# Patient Record
Sex: MALE | Race: WHITE
[De-identification: names, ages, dates, MRNs, and addresses within clinical notes are randomized per-mention and may not be internally consistent; named-entity substitution may affect disease eponyms.]

---

## 2017-07-02 ENCOUNTER — HOSPITAL ENCOUNTER (EMERGENCY)
Dept: HOSPITAL 56 - MW.ED | Age: 42
Discharge: HOME | End: 2017-07-02
Payer: COMMERCIAL

## 2017-07-02 VITALS — SYSTOLIC BLOOD PRESSURE: 105 MMHG | DIASTOLIC BLOOD PRESSURE: 69 MMHG

## 2017-07-02 DIAGNOSIS — F17.210: ICD-10-CM

## 2017-07-02 DIAGNOSIS — K04.7: Primary | ICD-10-CM

## 2017-07-02 PROCEDURE — 99282 EMERGENCY DEPT VISIT SF MDM: CPT

## 2017-07-02 NOTE — EDM.PDOC
ED HPI GENERAL MEDICAL PROBLEM





- General


Chief Complaint: ENT Problem


Stated Complaint: FACIAL SWELLING


Time Seen by Provider: 07/02/17 18:00


Source of Information: Reports: Patient


History Limitations: Reports: No Limitations





- History of Present Illness


INITIAL COMMENTS - FREE TEXT/NARRATIVE: 


History of present illness:


[41-year-old male presents with left-sided jaw swelling. Patient has had this 

in the past and has had subsequent dental work approximately 3 months ago with 

full resolution and now he finds it returning again today. Patient indicates he 

did dialogue with his dentist and received an antibiotic but he comes in 

seeking dental balls and a short run of pain medicine to get him to his 

appointment in 10 days]





Review of systems: 


As per history of present illness and below otherwise all systems reviewed and 

negative.





Past medical history: 


As per history of present illness and as reviewed below otherwise 

noncontributory.





Surgical history: 


As per history of present illness and as reviewed below otherwise 

noncontributory.





Social history: 


No reported history of drug or alcohol abuse.





Family history: 


As per history of present illness and as reviewed below otherwise 

noncontributory.





Physical exam:


HEENT: Atraumatic, swelling to left side of jaw in the region of 17 through 19, 

able to visualize a new crown on the lower left back molar, pupils reactive, 

negative for conjunctival pallor or scleral icterus, mucous membranes moist, 

throat clear, neck supple, nontender, trachea midline.


Lungs: Clear to auscultation, breath sounds equal bilaterally, chest nontender.


Heart: S1S2, regular, negative for clicks, rubs, or JVD.


Abdomen: Soft, nondistended, nontender. Negative for masses or 

hepatosplenomegaly. Negative for costovertebral tenderness.


Pelvis: Stable nontender.


Genitourinary: Deferred.


Rectal: Deferred.


Extremities: Atraumatic, negative for cords or calf pain. Neurovascular 

unremarkable.


Neuro: Awake, alert, oriented. Cranial nerves II through XII unremarkable. 

Cerebellum unremarkable. Motor and sensory unremarkable throughout. Exam 

nonfocal.





Diagnostics:


[]





Therapeutics:


[]





Impression: 


[Dental abscess]





Plan:


[Dental balls pain medicine]





Definitive disposition and diagnosis as appropriate pending reevaluation and 

review of above.








  ** Left Lower Face


Pain Score (Numeric/FACES): 10





- Related Data


 Allergies











Allergy/AdvReac Type Severity Reaction Status Date / Time


 


No Known Allergies Allergy   Verified 07/02/17 17:52











Home Meds: 


 Home Meds





Cephalexin 500 mg PO QID 07/02/17 [History]











Past Medical History





- Past Health History


Medical/Surgical History: Denies Medical/Surgical History


Other HEENT History: root canal





- Infectious Disease History


Infectious Disease History: Reports: Chicken Pox





- Past Surgical History


HEENT Surgical History: Reports: None





Social & Family History





- Family History


Family Medical History: Noncontributory





- Tobacco Use


Smoking Status *Q: Current Every Day Smoker


Years of Tobacco use: 22


Packs/Tins Daily: 1





- Caffeine Use


Caffeine Use: Reports: Coffee


Caffeine Use Comment: 2cups/day





- Recreational Drug Use


Recreational Drug Use: No





- Living Situation & Occupation


Living situation: Reports: 


Occupation: Employed





ED ROS ENT





- Review of Systems


Review Of Systems: See Below (See history of present illness)





ED EXAM, ENT





- Physical Exam


Exam: See Below (See history of present illness)





Course





- Vital Signs


Last Recorded V/S: 





 Last Vital Signs











Temp  37.0 C   07/02/17 17:47


 


Pulse  110 H  07/02/17 17:47


 


Resp  18   07/02/17 17:47


 


BP  114/72   07/02/17 17:47


 


Pulse Ox  97   07/02/17 17:47














- Orders/Labs/Meds


Meds: 





Medications














Discontinued Medications














Generic Name Dose Route Start Last Admin





  Trade Name Etienne  PRN Reason Stop Dose Admin


 


Benzocaine  2 each  07/02/17 18:11  





  Hurricaine One 20%  MUCMEM  07/02/17 18:12  





  ONETIME ONE   


 


Lidocaine HCl  15 ml  07/02/17 18:11  





  Xylocaine 2% Viscous  PO  07/02/17 18:12  





  ONETIME ONE   














Departure





- Departure


Time of Disposition: 18:38


Disposition: Home, Self-Care 01


Condition: Good


Clinical Impression: 


 Dental abscess








- Discharge Information


Forms:  ED Department Discharge


Additional Instructions: 


The following information is given to patients seen in the emergency department 

who are being discharged to home. This information is to outline your options 

for follow-up care. We provide all patients seen in our emergency department 

with a follow-up referral.





The need for follow-up, as well as the timing and circumstances, are variable 

depending upon the specifics of your emergency department visit.





If you don't have a primary care physician on staff, we will provide you with a 

referral. We always advise you to contact your personal physician following an 

emergency department visit to inform them of the circumstance of the visit and 

for follow-up with them and/or the need for any referrals to a consulting 

specialist.





The emergency department will also refer you to a specialist when appropriate. 

This referral assures that you have the opportunity for follow-up care with a 

specialist. All of these measure are taken in an effort to provide you with 

optimal care, which includes your follow-up.





Under all circumstances we always encourage you to contact your private 

physician who remains a resource for coordinating your care. When calling for 

follow-up care, please make the office aware that this follow-up is from your 

recent emergency room visit. If for any reason you are refused follow-up, 

please contact the [facility] Emergency 

Department at [phone] and asked to speak to the emergency department 

charge nurse.





Take medication as directed





Follow-up with your dentist as discussed








Return to ED as needed as discussed

## 2019-02-15 ENCOUNTER — HOSPITAL ENCOUNTER (EMERGENCY)
Dept: HOSPITAL 56 - MW.ED | Age: 44
Discharge: HOME | End: 2019-02-15
Payer: COMMERCIAL

## 2019-02-15 VITALS — DIASTOLIC BLOOD PRESSURE: 70 MMHG | SYSTOLIC BLOOD PRESSURE: 100 MMHG

## 2019-02-15 DIAGNOSIS — J06.9: Primary | ICD-10-CM

## 2019-02-15 DIAGNOSIS — Z87.891: ICD-10-CM

## 2019-02-15 NOTE — EDM.PDOC
ED HPI GENERAL MEDICAL PROBLEM





- General


Chief Complaint: Respiratory Problem


Stated Complaint: SICK


Time Seen by Provider: 02/15/19 11:01


Source of Information: Reports: Patient


History Limitations: Reports: No Limitations





- History of Present Illness


INITIAL COMMENTS - FREE TEXT/NARRATIVE: 





HISTORY AND PHYSICAL:





History of present illness:


Patient is a 43-year-old male here with complaint of fevers x 5 days. He states 

he has had temps around 102F, cough, and sore throat. Relative recently was 

treated for strep throat. He denies chest pain, SOB, vomiting, diarrhea, 

abdominal pain. He is taking Tylenol as needed for his fevers. 





Review of systems: 


As per history of present illness and below otherwise all systems reviewed and 

negative.





Past medical history: 


As per history of present illness and as reviewed below otherwise 

noncontributory.





Surgical history: 


As per history of present illness and as reviewed below otherwise 

noncontributory.





Social history: 


No reported history of drug or alcohol abuse.





Family history: 


As per history of present illness and as reviewed below otherwise 

noncontributory.





Physical exam:


General: Patient sitting comfortably in no acute distress and nontoxic appearing


HEENT: Atraumatic, normocephalic, pupils reactive, negative for conjunctival 

pallor or scleral icterus, mucous membranes moist, throat clear, neck supple, 

nontender, trachea midline. No meningeal signs. 


Lungs: Clear to auscultation, breath sounds equal bilaterally, chest nontender.


Heart: S1S2, regular, negative for clicks, rubs, or overt murmur.


Abdomen: Soft, nondistended, nontender. Negative for masses or 

hepatosplenomegaly. Negative for costovertebral tenderness.


Pelvis: Stable nontender.


Genitourinary: Deferred.


Rectal: Deferred.


Extremities: Atraumatic, negative for cords or calf pain. Neurovascular 

unremarkable.


Neuro: Awake, alert, oriented. Cranial nerves II through XII unremarkable. 

Cerebellum unremarkable. Motor and sensory unremarkable throughout. Exam 

nonfocal.





Notes: 





Diagnostics:


Rapid strep, influenza, CXR 





Therapeutics:


None





Prescriptions:


None





Impression: 


Viral URI 





Plan:


1. Alternate tylenol and motrin as needed. 


2. Follow up with primary care provider


3. Return to ED as needed as discussed 





Definitive disposition and diagnosis as appropriate pending reevaluation and 

review of above.








- Related Data


 Allergies











Allergy/AdvReac Type Severity Reaction Status Date / Time


 


No Known Allergies Allergy   Verified 02/15/19 10:52











Home Meds: 


 Home Meds





. [No Known Home Meds]  02/15/19 [History]











Past Medical History





- Past Health History


Medical/Surgical History: Denies Medical/Surgical History


Other HEENT History: root canal





- Infectious Disease History


Infectious Disease History: Reports: Chicken Pox





- Past Surgical History


HEENT Surgical History: Reports: None





Social & Family History





- Family History


Family Medical History: Noncontributory





- Tobacco Use


Smoking Status *Q: Former Smoker


Used Tobacco, but Quit: Yes


Month/Year Tobacco Last Used: 10/2018





- Caffeine Use


Caffeine Use: Reports: Coffee, Soda


Caffeine Use Comment: 2cups/day





- Recreational Drug Use


Recreational Drug Use: No





- Living Situation & Occupation


Living situation: Reports: 


Occupation: Employed





ED ROS GENERAL





- Review of Systems


Review Of Systems: ROS reveals no pertinent complaints other than HPI.





ED EXAM, GENERAL





- Physical Exam


Exam: See Below (see dictation)





Course





- Vital Signs


Last Recorded V/S: 


 Last Vital Signs











Temp  97.6 F   02/15/19 10:50


 


Pulse  68   02/15/19 10:50


 


Resp  18   02/15/19 10:50


 


BP  106/69   02/15/19 10:50


 


Pulse Ox  96   02/15/19 10:50














- Orders/Labs/Meds


Orders: 


 Active Orders 24 hr











 Category Date Time Status


 


 CULTURE STREP A CONFIRMATION [] Stat Lab  02/15/19 10:58 Results


 


 STREP SCRN A RAPID W CULT CONF [RM] Stat Lab  02/15/19 10:58 Results














Departure





- Departure


Time of Disposition: 12:58


Disposition: Home, Self-Care 01


Condition: Good


Clinical Impression: 


 Viral URI








- Discharge Information


Referrals: 


PCP,Unknown [Primary Care Provider] - 


Forms:  ED Department Discharge


Additional Instructions: 


The following information is given to patients seen in the emergency department 

who are being discharged to home. This information is to outline your options 

for follow-up care. We provide all patients seen in our emergency department 

with a follow-up referral.





The need for follow-up, as well as the timing and circumstances, are variable 

depending upon the specifics of your emergency department visit.





If you don't have a primary care physician on staff, we will provide you with a 

referral. We always advise you to contact your personal physician following an 

emergency department visit to inform them of the circumstance of the visit and 

for follow-up with them and/or the need for any referrals to a consulting 

specialist.





The emergency department will also refer you to a specialist when appropriate. 

This referral assures that you have the opportunity for follow-up care with a 

specialist. All of these measure are taken in an effort to provide you with 

optimal care, which includes your follow-up.





Under all circumstances we always encourage you to contact your private 

physician who remains a resource for coordinating your care. When calling for 

follow-up care, please make the office aware that this follow-up is from your 

recent emergency room visit. If for any reason you are refused follow-up, 

please contact the [facility] Emergency 

Department at [phone] and asked to speak to the emergency department 

charge nurse.





[facility]


Primary Care


[address] [place] [address]


Phone: [phone]


Fax: [phone]





[address] [address]


Phone: [phone]


Fax: [phone]





1. Alternate tylenol and motrin as needed. 


2. Follow up with primary care provider


3. Return to ED as needed as discussed 





- My Orders


Last 24 Hours: 


My Active Orders





02/15/19 10:58


CULTURE STREP A CONFIRMATION [RM] Stat 


STREP SCRN A RAPID W CULT CONF [RM] Stat 














- Assessment/Plan


Last 24 Hours: 


My Active Orders





02/15/19 10:58


CULTURE STREP A CONFIRMATION [RM] Stat 


STREP SCRN A RAPID W CULT CONF [RM] Stat

## 2019-02-18 ENCOUNTER — HOSPITAL ENCOUNTER (EMERGENCY)
Dept: HOSPITAL 56 - MW.ED | Age: 44
Discharge: HOME | End: 2019-02-18
Payer: COMMERCIAL

## 2019-02-18 VITALS — DIASTOLIC BLOOD PRESSURE: 72 MMHG | SYSTOLIC BLOOD PRESSURE: 122 MMHG

## 2019-02-18 DIAGNOSIS — J18.9: Primary | ICD-10-CM

## 2019-02-18 DIAGNOSIS — Z87.891: ICD-10-CM

## 2019-02-18 LAB
CHLORIDE SERPL-SCNC: 100 MMOL/L (ref 98–107)
SODIUM SERPL-SCNC: 140 MMOL/L (ref 136–148)

## 2019-02-18 PROCEDURE — 96361 HYDRATE IV INFUSION ADD-ON: CPT

## 2019-02-18 PROCEDURE — 80053 COMPREHEN METABOLIC PANEL: CPT

## 2019-02-18 PROCEDURE — 71045 X-RAY EXAM CHEST 1 VIEW: CPT

## 2019-02-18 PROCEDURE — 36415 COLL VENOUS BLD VENIPUNCTURE: CPT

## 2019-02-18 PROCEDURE — 85025 COMPLETE CBC W/AUTO DIFF WBC: CPT

## 2019-02-18 PROCEDURE — 96374 THER/PROPH/DIAG INJ IV PUSH: CPT

## 2019-02-18 PROCEDURE — 99283 EMERGENCY DEPT VISIT LOW MDM: CPT

## 2019-02-18 NOTE — EDM.PDOC
ED HPI GENERAL MEDICAL PROBLEM





- General


Chief Complaint: Gastrointestinal Problem


Stated Complaint: SICK


Time Seen by Provider: 02/18/19 11:32


Source of Information: Reports: Patient


History Limitations: Reports: No Limitations





- History of Present Illness


INITIAL COMMENTS - FREE TEXT/NARRATIVE: 


HISTORY AND PHYSICAL:





History of present illness:


Patient is a 43-year-old male who presents to the ED today with concerns of 

intermittent fevers, dizziness, nausea causing vomiting, and cough. Patient was 

in the ED 3 days ago and was tested for influenza, strep, and had a chest x-ray 

which were all unremarkable. He states that since then he started developing 

the dizziness was severe nausea. He states he is unable to take a shower 

otherwise he would get dizzy and throw up. He states that as he walks he has 

episodes of dizziness and severe nausea and has to sit down. Denies any syncope 

and near syncope. He does have history of cold sores which one had just 

appeared today on his bottom lip.





Patient denies abdominal pain, diarrhea, constipation, difficulties urinating, 

burning with urination, visual changes, diaphoresis, headache, difficulties 

breathing, shortness of breath, or all other GI, , cardiovascular, or 

respiratory symptoms. Patient denies any health history.





Review of systems: 


As per history of present illness and below otherwise all systems reviewed and 

negative.





Past medical history: 


As per history of present illness and as reviewed below otherwise 

noncontributory.





Surgical history: 


As per history of present illness and as reviewed below otherwise 

noncontributory.





Social history: 


See social history for further information





Family history: 


As per history of present illness and as reviewed below otherwise 

noncontributory.





Physical exam:


General: Well-developed and well-nourished 43-year-old male. Alert and 

oriented. Nontoxic appearing and in no acute distress.


HEENT: Atraumatic, normocephalic, pupils equal and reactive bilaterally, 

negative for conjunctival pallor or scleral icterus, mucous membranes moist, 

TMs normal bilaterally, throat clear, neck supple, nontender, trachea midline. 

No drooling or trismus noted. No meningeal signs. No hot potato voice noted. 

Patient does have the start of a cold sore on his bottom lip centrally.


Lungs: Clear to auscultation, breath sounds equal bilaterally, chest nontender.


Heart: S1S2, regular rate and rhythm without overt murmur


Abdomen: Soft, nondistended, nontender. Negative for masses or 

hepatosplenomegaly. Negative for costovertebral tenderness.


Pelvis: Stable nontender.


Genitourinary: Deferred.


Rectal: Deferred.


Skin: Intact, warm, dry. No lesions or rashes noted.


Extremities: Atraumatic, negative for cords or calf pain. Neurovascular 

unremarkable.


Neuro: Awake, alert, oriented. Cranial nerves II through XII unremarkable. 

Cerebellum unremarkable. Motor and sensory unremarkable throughout. Exam 

nonfocal.





Notes:


Vitals today are reassuring. Exam is unremarkable today. Will do labs, cxr, and 

fluids.


Lab work is unreamrkabout. Chest x-ray does show minimal left basilar 

atelectasis or infiltrate. 





Discussed these findings with patient. Discussed the risks versus benefit of 

outpatient treatment. Patient states that he feels quite comfortable going home 

and will return to the ED if needed. Prescription for antibiotic, Zofran, and 

pro-air inhaler given to patient. Discussed the importance of close follow-up 

with his primary care physician in the next 1-2 days. Supportive care measures 

were reviewed and discussed. Voices understanding and is agreeable to plan of 

care. Denies any further questions or concerns at this time.





Diagnostics:


CBC, CMP, cxr





Therapeutics:


IV fluid, Zofran





Prescription:


Azithromycin, zofran, proair





Impression: 


1. Community Acquired Pneumonia





Plan:


1. Take medication as prescribed. Use inhaler as needed for cough. You can use 

Zofran as needed for nausea.


2. You can take Tylenol or ibuprofen as needed for fevers or discomfort.


3. Follow-up with her primary care provider in the next 1-2 days.


4. Return to the ED as needed and as discussed.


Definitive disposition and diagnosis as appropriate pending reevaluation and 

review of above.








- Related Data


 Allergies











Allergy/AdvReac Type Severity Reaction Status Date / Time


 


No Known Allergies Allergy   Verified 02/18/19 11:05











Home Meds: 


 Home Meds





. [No Known Home Meds]  02/15/19 [History]











Past Medical History





- Past Health History


Medical/Surgical History: Denies Medical/Surgical History


Other HEENT History: root canal


Cardiovascular History: Reports: None


Respiratory History: Reports: None


Gastrointestinal History: Reports: None


Genitourinary History: Reports: None


Musculoskeletal History: Reports: Fracture


Neurological History: Reports: None


Psychiatric History: Reports: None


Endocrine/Metabolic History: Reports: None


Hematologic History: Reports: None


Immunologic History: Reports: None


Dermatologic History: Reports: None





- Infectious Disease History


Infectious Disease History: Reports: Chicken Pox





- Past Surgical History


Head Surgeries/Procedures: Reports: None


HEENT Surgical History: Reports: None





Social & Family History





- Family History


Family Medical History: Noncontributory





- Tobacco Use


Smoking Status *Q: Former Smoker


Years of Tobacco use: 20


Used Tobacco, but Quit: Yes


Month/Year Tobacco Last Used: Aug 2018





- Caffeine Use


Caffeine Use: Reports: Coffee


Caffeine Use Comment: 2cups/day





- Recreational Drug Use


Recreational Drug Use: No





- Living Situation & Occupation


Living situation: Reports: 


Occupation: Employed





ED ROS GENERAL





- Review of Systems


Review Of Systems: ROS reveals no pertinent complaints other than HPI.





ED EXAM, GI/ABD





- Physical Exam


Exam: See Below (see dictation)





Course





- Vital Signs


Last Recorded V/S: 


 Last Vital Signs











Temp  98.8 F   02/18/19 11:05


 


Pulse  78   02/18/19 12:18


 


Resp  18   02/18/19 12:18


 


BP  116/71   02/18/19 12:18


 


Pulse Ox  97   02/18/19 12:18














- Orders/Labs/Meds


Labs: 


 Laboratory Tests











  02/18/19 02/18/19 Range/Units





  11:45 11:45 


 


WBC  5.52   (4.0-11.0)  K/uL


 


RBC  4.31 L   (4.50-5.90)  M/uL


 


Hgb  13.1   (13.0-17.0)  g/dL


 


Hct  39.6   (38.0-50.0)  %


 


MCV  91.9   (80.0-98.0)  fL


 


MCH  30.4   (27.0-32.0)  pg


 


MCHC  33.1   (31.0-37.0)  g/dL


 


RDW Std Deviation  42.8   (28.0-62.0)  fl


 


RDW Coeff of Brando  13   (11.0-15.0)  %


 


Plt Count  238   (150-400)  K/uL


 


MPV  9.90   (7.40-12.00)  fL


 


Neut % (Auto)  61.7   (48.0-80.0)  %


 


Lymph % (Auto)  27.0   (16.0-40.0)  %


 


Mono % (Auto)  10.5   (0.0-15.0)  %


 


Eos % (Auto)  0.4   (0.0-7.0)  %


 


Baso % (Auto)  0.4   (0.0-1.5)  %


 


Neut # (Auto)  3.4   (1.4-5.7)  K/uL


 


Lymph # (Auto)  1.5   (0.6-2.4)  K/uL


 


Mono # (Auto)  0.6   (0.0-0.8)  K/uL


 


Eos # (Auto)  0.0   (0.0-0.7)  K/uL


 


Baso # (Auto)  0.0   (0.0-0.1)  K/uL


 


Nucleated RBC %  0.0   /100WBC


 


Nucleated RBCs #  0   K/uL


 


Sodium   140  (136-148)  mmol/L


 


Potassium   4.0  (3.5-5.1)  mmol/L


 


Chloride   100  ()  mmol/L


 


Carbon Dioxide   29.0  (21.0-32.0)  mmol/L


 


BUN   17  (7.0-18.0)  mg/dL


 


Creatinine   0.9  (0.8-1.3)  mg/dL


 


Est Cr Clr Drug Dosing   97.02  mL/min


 


Estimated GFR (MDRD)   > 60.0  ml/min


 


Glucose   114 H  ()  mg/dL


 


Calcium   9.3  (8.5-10.1)  mg/dL


 


Total Bilirubin   0.7  (0.2-1.0)  mg/dL


 


AST   31  (15-37)  IU/L


 


ALT   26  (14-63)  IU/L


 


Alkaline Phosphatase   43 L  ()  U/L


 


Total Protein   8.1  (6.4-8.2)  g/dL


 


Albumin   3.8  (3.4-5.0)  g/dL


 


Globulin   4.3 H  (2.6-4.0)  g/dL


 


Albumin/Globulin Ratio   0.9  (0.9-1.6)  











Meds: 


Medications














Discontinued Medications














Generic Name Dose Route Start Last Admin





  Trade Name Freq  PRN Reason Stop Dose Admin


 


Sodium Chloride  1,000 mls @ 999 mls/hr  02/18/19 11:25  02/18/19 11:46





  Normal Saline  IV  02/18/19 12:25  999 mls/hr





  STAT ONE   Administration





     





     





     





     


 


Ondansetron HCl  4 mg  02/18/19 11:25  02/18/19 11:46





  Zofran  IVPUSH  02/18/19 11:26  4 mg





  ONETIME ONE   Administration





     





     





     





     














Departure





- Departure


Time of Disposition: 12:52


Disposition: Home, Self-Care 01


Clinical Impression: 


Community acquired pneumonia


Qualifiers:


 Laterality: unspecified laterality Qualified Code(s): J18.9 - Pneumonia, 

unspecified organism








- Discharge Information


Instructions:  Community-Acquired Pneumonia, Adult, Easy-to-Read


Referrals: 


PCP,Unknown [Primary Care Provider] - 


Forms:  ED Department Discharge


Additional Instructions: 


The following information is given to patients seen in the emergency department 

who are being discharged to home. This information is to outline your options 

for follow-up care. We provide all patients seen in our emergency department 

with a follow-up referral.





The need for follow-up, as well as the timing and circumstances, are variable 

depending upon the specifics of your emergency department visit.





If you don't have a primary care physician on staff, we will provide you with a 

referral. We always advise you to contact your personal physician following an 

emergency department visit to inform them of the circumstance of the visit and 

for follow-up with them and/or the need for any referrals to a consulting 

specialist.





The emergency department will also refer you to a specialist when appropriate. 

This referral assures that you have the opportunity for follow-up care with a 

specialist. All of these measure are taken in an effort to provide you with 

optimal care, which includes your follow-up.





Under all circumstances we always encourage you to contact your private 

physician who remains a resource for coordinating your care. When calling for 

follow-up care, please make the office aware that this follow-up is from your 

recent emergency room visit. If for any reason you are refused follow-up, 

please contact the [facility] Emergency 

Department at [phone] and asked to speak to the emergency department 

charge nurse.





[facility]


Primary Care


[address] [place] [address]


Phone: [phone]


Fax: [phone]





[address] [address]


Phone: [phone]


Fax: [phone]





1. Take medication as prescribed. Use inhaler as needed for cough. You can use 

Zofran as needed for nausea.


2. You can take Tylenol or ibuprofen as needed for fevers or discomfort.


3. Follow-up with her primary care provider in the next 1-2 days.


4. Return to the ED as needed and as discussed.

## 2019-02-18 NOTE — CR
EXAMINATION: Portable chest radiograph.

 

HISTORY: Cough.

 

Comparison: 2/15/2019.

 

FINDINGS: 

The trachea is midline. The cardiomediastinal silhouette is within normal

limits. Minimal (atelectasis and/or infiltrate. Mild hyperinflation.

 

Osseous structures appear unremarkable.

 

IMPRESSION: 

Minimal left basilar atelectasis and/or infiltrate.